# Patient Record
Sex: MALE | Race: ASIAN | NOT HISPANIC OR LATINO | Employment: STUDENT | URBAN - METROPOLITAN AREA
[De-identification: names, ages, dates, MRNs, and addresses within clinical notes are randomized per-mention and may not be internally consistent; named-entity substitution may affect disease eponyms.]

---
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
## 2022-03-31 ENCOUNTER — OFFICE VISIT (OUTPATIENT)
Dept: DERMATOLOGY | Facility: CLINIC | Age: 22
End: 2022-03-31
Attending: FAMILY MEDICINE
Payer: COMMERCIAL

## 2022-03-31 DIAGNOSIS — F51.01 PRIMARY INSOMNIA: ICD-10-CM

## 2022-03-31 DIAGNOSIS — L70.0 ACNE VULGARIS: ICD-10-CM

## 2022-03-31 PROCEDURE — 99204 OFFICE O/P NEW MOD 45 MIN: CPT | Mod: GC

## 2022-03-31 RX ORDER — TRETINOIN 0.25 MG/G
CREAM TOPICAL
Qty: 45 G | Refills: 3 | Status: SHIPPED | OUTPATIENT
Start: 2022-03-31

## 2022-03-31 RX ORDER — DOXYCYCLINE 100 MG/1
100 CAPSULE ORAL 2 TIMES DAILY
Qty: 180 CAPSULE | Refills: 0 | Status: SHIPPED | OUTPATIENT
Start: 2022-03-31

## 2022-03-31 RX ORDER — CLINDAMYCIN AND BENZOYL PEROXIDE 10; 50 MG/G; MG/G
GEL TOPICAL 2 TIMES DAILY
Qty: 50 G | Refills: 5 | Status: SHIPPED | OUTPATIENT
Start: 2022-03-31

## 2022-03-31 RX ORDER — HYDROXYZINE PAMOATE 50 MG/1
50 CAPSULE ORAL AT BEDTIME
Qty: 30 CAPSULE | Refills: 1 | Status: CANCELLED | OUTPATIENT
Start: 2022-03-31

## 2022-03-31 ASSESSMENT — PAIN SCALES - GENERAL: PAINLEVEL: MILD PAIN (2)

## 2022-03-31 NOTE — PATIENT INSTRUCTIONS
Start tretinoin 0.025% cream at bedtime. Apply pea-sized amount to face. This can be drying so please start every other night, then increase to nightly as tolerated after 1-2 weeks.    Continue doxycycline 100mg twice daily for 3 months, then stop.

## 2022-03-31 NOTE — PROGRESS NOTES
[facility] Dermatology Note  Encounter Date: Mar 31, 2022  Office Visit     Dermatology Problem List:  # Acne vulgaris   - Current tx: doxycycline, tretinoin 0.025, clindamycin/BP gel  - Past tx: minocycline  - Future options: isotretinoin   ____________________________________________    Assessment & Plan:   # Acne vulgaris  Discussed that acne is secondary to follicular occlusion which is exacerbated by hormonal influence. Treatments were discussed at length including topical agents and systemic medications. Acne is inflammatory/comedonal.   - Continue doxycycline 100mg BID for 3 months, then stop  - Start tretinoin 0.025% cream every other night increasing to nightly as tolerated  - Continue clindamycin-benzoyl peroxide gel    Procedures Performed:   None    Follow-up: 3 months, prn for new or changing lesions    Staff and Resident:     Jordan Gomes MD  PGY-2 Dermatology  Pager: [phone]    ____________________________________________    CC: Acne (pt states he is here for acne on face , 1 year )    HPI:  Mr. Fabiana Fairbanks is a(n) 22 year old male who presents today as a new patient for acne  -Patient presents today accompanied by sister for acne for the past 1 year.  Referred by primary care provider who prescribed her on the doxycycline and topical clindamycin/benzyl peroxide.  He notes improvement of his acne on the doxycycline and is tolerating this medication well without any side effects.  He does need a refill.  Uses the topical clindamycin/benzyl peroxide on a regular basis.  Prior notes state that he has used tretinoin, but he states he did not use this for a long period of time.  No acne on the chest or back. Patient is otherwise feeling well, without additional skin concerns.    Labs Reviewed:  N/A    Physical Exam:  Vitals: There were no vitals taken for this visit.  SKIN: Acne exam, which includes the face, neck, upper central chest, and upper central back was performed.  - Open  and closed comedones on the forehead and cheeks with few inflammatory papules on a background of post inflammatory hyperpigmentation and very mild atrophic scarring  - No other lesions of concern on areas examined.     Medications:  Current Outpatient Medications   Medication     clindamycin-benzoyl peroxide (BENZACLIN) 1-5 % external gel     doxycycline monohydrate (MONODOX) 100 MG capsule     hydrOXYzine (VISTARIL) 50 MG capsule     tretinoin (RETIN-A) 0.025 % external cream     No current facility-administered medications for this visit.      Past Medical History:   There is no problem list on file for this patient.    No past medical history on file.    CC Min Tabares MD  [address] [place] [address] on close of this encounter.

## 2022-03-31 NOTE — PROGRESS NOTES
I talked with and examined Fabiana Fairbanks and I agree with the assessment and the plan. GENA Brown MD.

## 2022-03-31 NOTE — LETTER
3/31/2022       RE: Fabiana Fairbanks  [address] [place] [address]     Dear Colleague,    Thank you for referring your patient, Fabiana Fairbanks, to the [facility]ERMAT[facility] [place] at [facility]. Please see a copy of my visit note below.    [facility] Dermatology Note  Encounter Date: Mar 31, 2022  Office Visit     Dermatology Problem List:  # Acne vulgaris   - Current tx: doxycycline, tretinoin 0.025, clindamycin/BP gel  - Past tx: minocycline  - Future options: isotretinoin   ____________________________________________    Assessment & Plan:   # Acne vulgaris  Discussed that acne is secondary to follicular occlusion which is exacerbated by hormonal influence. Treatments were discussed at length including topical agents and systemic medications. Acne is inflammatory/comedonal.   - Continue doxycycline 100mg BID for 3 months, then stop  - Start tretinoin 0.025% cream every other night increasing to nightly as tolerated  - Continue clindamycin-benzoyl peroxide gel    Procedures Performed:   None    Follow-up: 3 months, prn for new or changing lesions    Staff and Resident:     Jordan Gomes MD  PGY-2 Dermatology  Pager: [phone]    ____________________________________________    CC: Acne (pt states he is here for acne on face , 1 year )    HPI:  Mr. Fabiana Fairbanks is a(n) 22 year old male who presents today as a new patient for acne  -Patient presents today accompanied by sister for acne for the past 1 year.  Referred by primary care provider who prescribed her on the doxycycline and topical clindamycin/benzyl peroxide.  He notes improvement of his acne on the doxycycline and is tolerating this medication well without any side effects.  He does need a refill.  Uses the topical clindamycin/benzyl peroxide on a regular basis.  Prior notes state that he has used tretinoin, but he states he did not use this for a long period of  time.  No acne on the chest or back. Patient is otherwise feeling well, without additional skin concerns.    Labs Reviewed:  N/A    Physical Exam:  Vitals: There were no vitals taken for this visit.  SKIN: Acne exam, which includes the face, neck, upper central chest, and upper central back was performed.  - Open and closed comedones on the forehead and cheeks with few inflammatory papules on a background of post inflammatory hyperpigmentation and very mild atrophic scarring  - No other lesions of concern on areas examined.     Medications:  Current Outpatient Medications   Medication     clindamycin-benzoyl peroxide (BENZACLIN) 1-5 % external gel     doxycycline monohydrate (MONODOX) 100 MG capsule     hydrOXYzine (VISTARIL) 50 MG capsule     tretinoin (RETIN-A) 0.025 % external cream     No current facility-administered medications for this visit.      Past Medical History:   There is no problem list on file for this patient.    No past medical history on file.    CC Min Tabares MD  [address] [place] [address] on close of this encounter.    I talked with and examined Fabiana Fairbanks and I agree with the assessment and the plan. GENA Brown MD.

## 2022-05-05 ENCOUNTER — APPOINTMENT (OUTPATIENT)
Dept: INTERPRETER SERVICES | Facility: CLINIC | Age: 22
End: 2022-05-05
Payer: COMMERCIAL